# Patient Record
Sex: FEMALE | Race: BLACK OR AFRICAN AMERICAN | NOT HISPANIC OR LATINO | URBAN - METROPOLITAN AREA
[De-identification: names, ages, dates, MRNs, and addresses within clinical notes are randomized per-mention and may not be internally consistent; named-entity substitution may affect disease eponyms.]

---

## 2019-03-22 ENCOUNTER — EMERGENCY (EMERGENCY)
Facility: HOSPITAL | Age: 29
LOS: 1 days | Discharge: HOME | End: 2019-03-22
Admitting: EMERGENCY MEDICINE
Payer: SELF-PAY

## 2019-03-22 VITALS
OXYGEN SATURATION: 99 % | SYSTOLIC BLOOD PRESSURE: 120 MMHG | DIASTOLIC BLOOD PRESSURE: 80 MMHG | HEART RATE: 102 BPM | TEMPERATURE: 98 F | RESPIRATION RATE: 18 BRPM

## 2019-03-22 DIAGNOSIS — F10.120 ALCOHOL ABUSE WITH INTOXICATION, UNCOMPLICATED: ICD-10-CM

## 2019-03-22 PROCEDURE — 99053 MED SERV 10PM-8AM 24 HR FAC: CPT

## 2019-03-22 PROCEDURE — 99284 EMERGENCY DEPT VISIT MOD MDM: CPT | Mod: 25

## 2019-03-22 NOTE — ED PROVIDER NOTE - OBJECTIVE STATEMENT
29 year old female brought in by EMS for alcohol intoxication with her friend. friend reports they were unable to get home so called 911. patient reports drank too much tonight. no fall or trauma. no medical complaints

## 2019-03-22 NOTE — ED ADULT TRIAGE NOTE - CHIEF COMPLAINT QUOTE
biba for ams due to etoh intoxication. Patient is awake alert and oriented and ambulatory with assistance. Arrives accompanied by friend. No injuries noted, no distress at triage.

## 2019-03-22 NOTE — ED PROVIDER NOTE - PROGRESS NOTE DETAILS
The patient is now awake and alert, clinically sober.  Able to walk a straight line.  Repeat exam and neuro/cranial nerve exams normal.  No evidence of head/neck trauma.  Patient denies any pain or other complaints.  Denies cp/sob/ha/abd pain.  Abd soft, lungs clear, heart exam normal.  Sarina po challenge.  Patient says only used alcohol no other substances.  Denies any assault.  Feels much better and pt feels safe for discharge.  No evidence of intoxication at this time or alcohol withdrawal.  No other complaints on discharge.  will go home with friend

## 2019-03-22 NOTE — ED PROVIDER NOTE - PHYSICAL EXAMINATION
General: lethargic, arousable to touch, smells of alcohol  Head: NCAT, no laceration/abrasion  Eyes: PERRL  EARS: tympanic membranes clear bilaterally, No redness, normal landmarks and light reflexes, canal clear without cerumen impaction, no HT bilaterally   Heart: RRR  Lungs: CTAB  Abd: soft, NTND  Neuro: moves all 4 extremities equally  Skin: no e/o lacerations, abrasions, or ecchymoses

## 2023-08-10 NOTE — ED ADULT TRIAGE NOTE - SOURCE OF INFORMATION
Patient/EMS
GEN - ao3, nontoxic, unlabored at rest  HEAD - NC/AT   EYES- PERRL, EOMI  ENT: Airway patent, mmm, Oral cavity and pharynx normal. No inflammation, swelling, exudate, or lesions.    NECK: Neck supple  PULMONARY - CTA b/l, symmetric breath sounds, unlabored at rest, becomes labored on minimal exertion, o2 sat wnl on ra at rest  CARDIAC -s1s2, RRR, no M,G,R  ABDOMEN - +BS, ND, NT, soft, no guarding, no rebound, no masses   BACK - no CVA tenderness, Normal  spine   EXTREMITIES - FROM, symmetric pulses, capillary refill < 2 seconds  SKIN - no rash or bruising   NEUROLOGIC - alert, speech clear, no focal deficits  PSYCH -nl mood/affect, nl insight.
No